# Patient Record
Sex: MALE | HISPANIC OR LATINO | Employment: FULL TIME | URBAN - METROPOLITAN AREA
[De-identification: names, ages, dates, MRNs, and addresses within clinical notes are randomized per-mention and may not be internally consistent; named-entity substitution may affect disease eponyms.]

---

## 2018-06-11 ENCOUNTER — HOSPITAL ENCOUNTER (EMERGENCY)
Facility: HOSPITAL | Age: 53
Discharge: HOME | End: 2018-06-11
Attending: EMERGENCY MEDICINE | Admitting: EMERGENCY MEDICINE
Payer: COMMERCIAL

## 2018-06-11 ENCOUNTER — APPOINTMENT (EMERGENCY)
Dept: RADIOLOGY | Facility: HOSPITAL | Age: 53
End: 2018-06-11
Payer: COMMERCIAL

## 2018-06-11 VITALS
SYSTOLIC BLOOD PRESSURE: 140 MMHG | OXYGEN SATURATION: 100 % | DIASTOLIC BLOOD PRESSURE: 81 MMHG | RESPIRATION RATE: 16 BRPM | TEMPERATURE: 98.1 F | HEART RATE: 69 BPM

## 2018-06-11 DIAGNOSIS — R07.89 CHEST WALL PAIN: Primary | ICD-10-CM
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
## 2018-06-11 PROCEDURE — 93010 ELECTROCARDIOGRAM REPORT: CPT | Performed by: INTERNAL MEDICINE

## 2018-06-11 PROCEDURE — 99284 EMERGENCY DEPT VISIT MOD MDM: CPT

## 2018-06-11 PROCEDURE — 93005 ELECTROCARDIOGRAM TRACING: CPT

## 2018-06-11 PROCEDURE — 71046 X-RAY EXAM CHEST 2 VIEWS: CPT

## 2018-06-11 PROCEDURE — 96372 THER/PROPH/DIAG INJ SC/IM: CPT

## 2018-06-11 RX ORDER — KETOROLAC TROMETHAMINE 30 MG/ML
60 INJECTION, SOLUTION INTRAMUSCULAR; INTRAVENOUS ONCE
Status: COMPLETED | OUTPATIENT
Start: 2018-06-11 | End: 2018-06-11

## 2018-06-11 RX ORDER — CYCLOBENZAPRINE HCL 10 MG
10 TABLET ORAL ONCE
Status: COMPLETED | OUTPATIENT
Start: 2018-06-11 | End: 2018-06-11

## 2018-06-11 RX ORDER — NAPROXEN 500 MG/1
500 TABLET ORAL 2 TIMES DAILY WITH MEALS
Qty: 30 TABLET | Refills: 0 | Status: SHIPPED | OUTPATIENT
Start: 2018-06-11 | End: 2018-07-09 | Stop reason: ALTCHOICE

## 2018-06-11 RX ORDER — CYCLOBENZAPRINE HCL 10 MG
10 TABLET ORAL 2 TIMES DAILY PRN
Qty: 20 TABLET | Refills: 0 | Status: SHIPPED | OUTPATIENT
Start: 2018-06-11 | End: 2018-07-09 | Stop reason: ALTCHOICE

## 2018-06-11 RX ADMIN — CYCLOBENZAPRINE HYDROCHLORIDE 10 MG: 10 TABLET, FILM COATED ORAL at 09:13

## 2018-06-11 RX ADMIN — KETOROLAC TROMETHAMINE 60 MG: 30 INJECTION, SOLUTION INTRAMUSCULAR at 09:10

## 2018-06-11 NOTE — DISCHARGE INSTRUCTIONS
Dolor de la pared torácica, cuidados ambulatorios   INFORMACIÓN GENERAL:   El dolor de la pared torácica  puede ser causado por problemas con los músculos, cartílago o huesos de la pared torácica  El dolor de la pared torácica también puede ser causado por dolor que se propaga a jarrett pecho y que proviene de otra parte de jarrett cuerpo  El dolor puede ser persistente, severo, leve o brett  Puede que venga y vaya o puede que sea [place]  El dolor también puede ser peor cuando usted se mueve de ciertas formas, respira profundo o tose  Busque cuidados inmediatos para los siguientes síntomas:   · Dolor severo    · Usted tiene cualquiera de los siguientes signos de un ataque cardíaco:      ¨ Estornudos, presión, o dolor en jarrett pecho que dura mas de 5 minutos o regresa  ¨ Incomodidad o dolor en jarrett espalda, beth, mandíbula, estómago, o brazos  ¨ Tiene dificultad para respirar  ¨ Náusea o vómitos  ¨ Se siente muy desvanecido o tiene sudores fríos especialmente en el pecho o dificultad para respirar  El tratamiento  depende de la causa de jarrett dolor de la pared torácica  Es probable que usted necesite alguno de los siguientes:  · Los antiiflamatorios no esteroideos (AINEs) ayudan a reducir inflamación y dolor o fiebre  Lin medicamento esta disponible con o sin saundra receta médica  Los AINEs podrían causar sangrado estomacal o problemas en los riñones en PFSweb  Si usted esta tomando un anticoágulante, siempre  pregunte a jarrett proveedor de nikole si los AINEs son seguros para usted  Antes de [place], jordy siempre la etiqueta de información y siga jayshree indicaciones  · El acetaminofén  disminuye el dolor y está disponible sin receta médica  Pregunte cuánto summer y con cuánta frecuencia  [facility]  el acetaminofén puede causar daño al hígado si no se rosario correctamente  · Aplique calor  a jarrett pecho por 20 a 30 minutos cada 2 horas por tantos días marky le indiquen   El calor ayuda a disminuir el dolor y los espasmos musculares  · Aplique hielo  a jarrett pecho por 15 a 20 minutos cada hora según indicaciones  Use saundra compresa de hielo o ponga hielo triturado en saundra bolsa de plástico  Liana Session con saundra toalla  El hielo ayuda a prevenir daños a los tejidos y disminuye la inflamación y el dolor  Programe saundra lizzy con jarrett proveedor de [employer] se le haya indicado: Anote jayshree preguntas para que se acuerde de hacerlas bárbara jayshree visitas  ACUERDOS SOBRE JARRETT CUIDADO:   Usted tiene el derecho de participar en la planificación de jarrett cuidado  Aprenda todo lo que pueda sobre jarrett condición y marky darle tratamiento  Discuta con jayshree médicos jayshree opciones de tratamiento para juntos decidir el cuidado que usted quiere recibir  Usted siempre tiene el derecho a rechazar jarrett tratamiento  Esta información es sólo para uso en educación  Jarrett intención no es darle un consejo médico sobre enfermedades o tratamientos  Colsulte con jarrett Dorna Riaz farmacéutico antes de seguir cualquier régimen médico para saber si es seguro y efectivo para usted  © 2014 [address] is for End User's use only and may not be sold, redistributed or otherwise used for commercial purposes  All illustrations and images included in CareNotes® are the copyrighted property of A D A M , Inc  or [facility]  Accidente automovilístico   LO QUE NECESITA SABER:   Los accidentes automovilísticos pueden causar lesiones ocasionadas por el impacto o por vitaliy sido movido de un lado al otro dentro del marleen  Podría tener un hematoma en el abdomen, pecho o beth debido al cinturón de seguridad  También puede que tenga dolor en jarrett zane, beth o espalda  Podría sentir dolor en las rodillas, caderas o muslos si jarrett cuerpo golpea el tablero o el volante  El dolor muscular tiende a empeorar de 1 a 2 días después del accidente    INSTRUCCIONES SOBRE EL [facility] HOSPITALARIA:   Kayleename al 911 si presenta:   · Usted tiene un nuevo dolor de pecho o éste [facility], o tiene falta de [place]  Regrese a la елена de emergencias si:   · Usted tiene un dolor nuevo o peor en el abdomen  · Usted tiene náuseas y vómitos que no mejoran  · Usted tiene un rahel dolor de marlene  · Usted tiene debilidad, hormigueo o adormecimiento en jayshree brazos o piernas  · Usted tiene un dolor nuevo o peor que le dificulta el movimiento  Pregúntele a jarrett [employer] vitaminas y minerales son adecuados para usted  · Usted tiene dolor que aparece de 2 a 3 días después del accidente  · Usted tiene preguntas o inquietudes acerca de jarrett condición o cuidado  Medicamentos:   · Analgésicos:  Usted podría recibir medicamento para quitarle o reducir el dolor  No espere a que el dolor sea muy intenso para summer el medicamento  · AINEs (Analgésicos antiinflamatorios no esteroides) marky el ibuprofeno, ayudan a disminuir la inflamación, el dolor y la [place]  Lin medicamento esta disponible con o sin saundra receta médica  Los AINEs pueden causar sangrado estomacal o problemas renales en ciertas personas  Si usted esta tomando un anticoágulante,  siempre  pregunte si los AINEs son seguros para usted  Siempre jordy la etiqueta de lin medicamento y [place] instrucciones  No administre lin medicamento a niños menores de 6 meses de everette sin antes obtener la autorización de jarrett médico      · [place] jayshree medicamentos marky se le haya indicado  Consulte con jarrett médico si usted genie que jarrett medicamento no le está ayudando o si presenta efectos secundarios  Infórmele si es alérgico a algún medicamento  Mantenga saundra lista actualizada de los [place], las vitaminas y los productos herbales que rosario  Incluya los siguientes datos de los medicamentos: cantidad, frecuencia y motivo de administración  Traiga con usted la lista o los envases de la píldoras a jayshree citas de seguimiento  Lleve la lista de los medicamentos con usted en roberto de saundra emergencia    Acuda a jayshree consultas de control con jarrett médico según le indicaron  Anote jayshree preguntas para que se acuerde de hacerlas bárbara jayshree visitas  Consejos de seguridad:   · Use siempre jarrett cinturón de seguridad  El [place] de jarrett cinturón de seguridad ayudará a reducir las lesiones sufridas por accidentes automovilísticos  · Use asientos de seguridad para niños  Es necesario que jarrett regino se siente en un asiento de seguridad para niños hecho para jarrett edad, altura, y [place]  Pregúntele a jarrett médico sobre [address] acerca de los asientos de seguridad para niños  · Brianafurt velocidad  Maneje jarrett marleen al límite de velocidad para reducir jarrett riesgo de accidentes automovilísticos  · No maneje si se siente cansado  Usted reacciona más lentamente cuando está cansado  El tiempo de reacción lento aumentará el riesgo de un accidente automovilístico      · No hable por teléfono ni envíe mensajes de texto [employer]  Usted no reaccionará lo suficientemente rápido en saundra emergencia si se distrae con mensajes de texto o conversaciones  · No kartik y [place]  Use un chofer designado  Llame un taxi o pídale a alguien que lo lleve a casa si ha estado bebiendo alcohol  No permita que jayshree amigos manejen si smith estado bebiendo alcohol  · No consuma drogas ilegales y [place]  Es probable que se sienta más cansado o tome riesgos que usualmente no tomaría  No maneje después de summer medicamentos prescritos que le dan sueño  Cuidados personales:   · Use hielo y calor  El hielo ayuda a disminuir la inflamación y el dolor  El hielo también puede contribuir a evitar el daño de los tejidos  Use un paquete de hielo o ponga hielo molido dentro de [employer]  [place] con saundra toalla y aplíquela al área adolorida por 15 a 20 minutos cada hora o marky se le indique  Después de 2 días, use saundra compresa caliente [employer]  Aplique calor marky se lo recomiende el médico      · Estire jayshree músculos cuidadosamente    Uzma Chris para estirar jayshree músculos después de vitaliy sufrido un accidente automovilístico  Consulte con moreno médico sobre cuáles ejercicios hacer  © 2017 [address] Information is for End User's use only and may not be sold, redistributed or otherwise used for commercial purposes  All illustrations and images included in CareNotes® are the copyrighted property of A D A M , Inc  or Baudilio Torres  Esta información es sólo para uso en educación  Moreno intención no es darle un consejo médico sobre enfermedades o tratamientos  Colsulte con moreno Sue Teddy farmacéutico antes de seguir cualquier régimen médico para saber si es seguro y efectivo para usted

## 2018-06-13 LAB
ATRIAL RATE: 66 BPM
P AXIS: 16 DEGREES
PR INTERVAL: 148 MS
QRS AXIS: 74 DEGREES
QRSD INTERVAL: 94 MS
QT INTERVAL: 398 MS
QTC INTERVAL: 417 MS
T WAVE AXIS: 13 DEGREES
VENTRICULAR RATE: 66 BPM
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
## 2018-07-09 ENCOUNTER — APPOINTMENT (EMERGENCY)
Dept: RADIOLOGY | Facility: HOSPITAL | Age: 53
End: 2018-07-09
Payer: COMMERCIAL

## 2018-07-09 ENCOUNTER — HOSPITAL ENCOUNTER (EMERGENCY)
Facility: HOSPITAL | Age: 53
Discharge: HOME | End: 2018-07-09
Attending: EMERGENCY MEDICINE
Payer: COMMERCIAL

## 2018-07-09 VITALS
TEMPERATURE: 97.8 F | WEIGHT: 160 LBS | DIASTOLIC BLOOD PRESSURE: 79 MMHG | RESPIRATION RATE: 16 BRPM | HEART RATE: 64 BPM | OXYGEN SATURATION: 99 % | SYSTOLIC BLOOD PRESSURE: 139 MMHG

## 2018-07-09 DIAGNOSIS — S20.219A CONTUSION OF CHEST WALL, UNSPECIFIED LATERALITY, INITIAL ENCOUNTER: Primary | ICD-10-CM

## 2018-07-09 LAB
ATRIAL RATE: 63 BPM
P AXIS: 10 DEGREES
PR INTERVAL: 144 MS
QRS AXIS: 69 DEGREES
QRSD INTERVAL: 80 MS
QT INTERVAL: 398 MS
QTC INTERVAL: 407 MS
T WAVE AXIS: 31 DEGREES
VENTRICULAR RATE: 63 BPM

## 2018-07-09 PROCEDURE — 93005 ELECTROCARDIOGRAM TRACING: CPT

## 2018-07-09 PROCEDURE — 99285 EMERGENCY DEPT VISIT HI MDM: CPT

## 2018-07-09 PROCEDURE — 72125 CT NECK SPINE W/O DYE: CPT

## 2018-07-09 PROCEDURE — 71250 CT THORAX DX C-: CPT

## 2018-07-09 PROCEDURE — 93010 ELECTROCARDIOGRAM REPORT: CPT | Performed by: INTERNAL MEDICINE

## 2018-07-09 RX ORDER — CYCLOBENZAPRINE HCL 10 MG
10 TABLET ORAL 2 TIMES DAILY PRN
Qty: 20 TABLET | Refills: 0 | Status: SHIPPED | OUTPATIENT
Start: 2018-07-09
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
## 2018-07-09 NOTE — DISCHARGE INSTRUCTIONS
Contusión en adultos   LO QUE NECESITA SABER:   [place] contusión es un moretón que aparece en la piel después de saundra Luz Budge  Un moretón se forma cuando se rompen los vasos sanguíneos pequeños, heather no se rompe la piel  Cuando los vasos sanguíneos se desgarran, la naresh se filtra a los tejidos cercanos, marky los tejidos blandos o los músculos  INSTRUCCIONES SOBRE EL [facility]ITAL[facility]:   Regrese a la елена de emergencias si:   · Le surge dificultad para  el área lesionada  · Usted tiene hormigueo o entumecimiento en el área lesionada o cerca de [place]  · El área de jarrett mano o pie que se encuentra debajo del moretón se pone fría o pálida  Pregúntele a jarrett [place] vitaminas y minerales son adecuados para usted  · Usted encuentra un bulto nuevo en el área lesionada  · Jayshree síntomas no mejoran después de 4 ó 5 días de [place]  · Usted tiene preguntas o inquietudes acerca de jarrett condición o cuidado  Medicamentos:  Es posible que usted necesite alguno de los siguientes:  · AINEs (Analgésicos antiinflamatorios no esteroides)  pueden disminuir la inflamación y el dolor o la fiebre  Kenna medicamento esta disponible con o sin saundra receta médica  Los AINEs pueden causar sangrado estomacal o problemas renales en ciertas personas  Si usted rosario un medicamento anticoagulante, siempre pregúntele a jarrett médico si los RADHA son seguros para usted  Siempre jordy la etiqueta de kenna medicamento y [place] instrucciones  · Un medicamento con receta para el dolor  podrían ser [place]  No espere a que el dolor sea muy intenso para summer el medicamento  · [place] jayshree medicamentos marky se le haya indicado  Consulte con jarrett médico si usted genie que jarrett medicamento no le está ayudando o si presenta efectos secundarios  Infórmele si es alérgico a algún medicamento  Mantenga saundra lista actualizada de los [place], las vitaminas y los productos herbales que rosario   Incluya los siguientes datos de los medicamentos: cantidad, frecuencia y motivo de administración  Traiga con usted la lista o los envases de la píldoras a jayshree citas de seguimiento  Lleve la lista de los medicamentos con usted en roberto de saundra emergencia  Acuda a jayshree consultas de control con jarrett médico según le indicaron  Es posible que deba regresar dentro de saundra semana para que le vuelvan a revisar la lesión  Anote jayshree preguntas para que se acuerde de hacerlas bárbara jayshree visitas  Ayude a que jarrett contusión sane:   · [address] [place] [address] menos que de [facility]  Si a usted le salieron moretones en jarrett pierna o pie, es posible que deba usar muletas o un bastón para caminar  [place] le ayudará a no apoyarse en la parte lesionada del cuerpo  · Aplique hielo  para bajar la inflamación y calmar el dolor  El hielo también puede contribuir a evitar el daño de los tejidos  Use un paquete de hielo o ponga hielo molido dentro de [employer]  Cubra el hielo con saundra toalla y colóquelo sobre el moretón bárbara 15 a 20 minutos cada hora o según le indiquen  · Use compresión  para apoyar Gilbert Faes y disminuir la hinchazón  Coloque un vendaje elástico alrededor de la ludin sobre el músculo lesionado  Asegúrese de que el vendaje no quede demasiado apretado  Se debería poder meter 1 dedo entre el vendaje y la piel  · Eleve la parte lesionada de jarrett cuerpo  por encima del nivel de jarrett corazón para ayudar a aliviar el dolor y la inflamación  Use almohadas, cobijas o toallas enrolladas para elevar el área tan a menudo marky sea posible  · No consuma alcohol  según las indicaciones  El alcohol puede retardar la curación  · No estire los músculos lesionados  inmediatamente después de la lesión  Pregunte a jarrett médico cuándo y cómo se puede estirar con precaución después de jarrett lesión  Los estiramientos suaves pueden ayudar a aumentar la flexibilidad  · No masajee el área ni utilice almohadillas térmicas  en la contusión inmediatamente después de la lesión   El calor y los masajes podrían retrasar la sanación  Jarrett médico podría indicarle que aplique calor después de varios días  En odin momento, el calor comenzará a servir para sanar la lesión  Evite otra contusión:   · Estírese y [place] en calor antes de practicar deportes o hacer ejercicio  · Use equipo de protección cuando practique deportes  Keeley Polka son Kike Kofi y las prendas [address] [place]  · Si comienza a hacer saundra nueva actividad física, empiece poco a poco para darle tiempo al cuerpo de acostumbrarse  © 2017 [facility] Information is for End User's use only and may not be sold, redistributed or otherwise used for commercial purposes  All illustrations and images included in CareNotes® are the copyrighted property of A D A M , Inc  or Baudilio Torres  Esta información es sólo para uso en educación  Jarrett intención no es darle un consejo médico sobre enfermedades o tratamientos  Colsulte con jarrett Dorna Riaz farmacéutico antes de seguir cualquier régimen médico para saber si es seguro y efectivo para usted

## 2021-07-26 ENCOUNTER — OFFICE VISIT (OUTPATIENT)
Dept: DENTISTRY | Facility: CLINIC | Age: 56
End: 2021-07-26

## 2021-07-26 VITALS — HEART RATE: 66 BPM | SYSTOLIC BLOOD PRESSURE: 149 MMHG | DIASTOLIC BLOOD PRESSURE: 84 MMHG

## 2021-07-26 DIAGNOSIS — K08.50 DEFECTIVE DENTAL RESTORATION: Primary | ICD-10-CM

## 2021-07-26 NOTE — PROGRESS NOTES
[facility] presents for crown prep #18  PMH reviewed, no changes  Fabricated bite matrix with bluprint and triple tray  Applied topical benzocaine, administered 2 carpules 2% lido 1:100k epi via MEME block and 1 carpule 4% articaine 1:100k epi via local infiltration  Verified anesthesia  Tooth prepped with reductions for PFZ  Patient had a strong tongue that was difficult to retract  He also could not open very wide for adequate access  Lastly, he tended to breathe with his mouth instead of his nose making him ask for breaks/suction with small amounts of water  Because of this, the preparation was not able to be refined today  At the next appointment, please drop the margins of the preparation subgingivally to aid in better retention of the future crown  Made provisional crown with provisa and matrix, refined with ayana on high speed  Confirmed provisional covers margins with no overhangs  Cemented provisional crown using Provicell  Removed excess cement, occlusion and contacts verified  Pt left satisfied and ambulatory      NV: Finalize crown prep and take impressions for #18

## 2021-08-31 ENCOUNTER — OFFICE VISIT (OUTPATIENT)
Dept: DENTISTRY | Facility: CLINIC | Age: 56
End: 2021-08-31

## 2021-08-31 VITALS — SYSTOLIC BLOOD PRESSURE: 129 MMHG | DIASTOLIC BLOOD PRESSURE: 76 MMHG | HEART RATE: 64 BPM

## 2021-08-31 DIAGNOSIS — Z01.21 ENCOUNTER FOR DENTAL EXAMINATION AND CLEANING WITH ABNORMAL FINDINGS: Primary | ICD-10-CM

## 2021-08-31 PROCEDURE — D0140 LIMITED ORAL EVALUATION - PROBLEM FOCUSED: HCPCS | Performed by: DENTIST

## 2021-08-31 NOTE — PROGRESS NOTES
Alexis Luo, 47 yo male came fr an emergency visit  Tooth #18 was prepped last time and temporary crown was placed  Patient lost the temporary crown and was sensitive to touch  PA was taken on tooth 18  There was no pre-apical pathology or radiolucency  Tooth #17 was erupting mesio-angularly which could be the reason for the pain  The clinical crown #18 was short especially on the distal, so the temporary crown kept on dislodging from the tooth  1 carpule of lidocaine 1:100,000 epi was used to block MEME and long buccal  Tooth 18 was prepped with short bur because patient can't open really big and clinical crown is short  The distal axial wall was increased and the mesial of the tooth was prepped as well  New temporary was made and cemented  Excess cement was removed from around the tooth using explorer and floss  Occlusion was check and patient left in stable condition  REFERRAL: WAS GIVEN TO [facility] FOR EXTRACTION OF 17  NV: after tooth 17 is extracted, refine margins on tooth 18 and take a final impression to send to lab for crown fabrication

## 2021-11-29 ENCOUNTER — HOSPITAL ENCOUNTER (EMERGENCY)
Facility: HOSPITAL | Age: 56
Discharge: HOME | End: 2021-11-29
Attending: EMERGENCY MEDICINE

## 2021-11-29 VITALS
RESPIRATION RATE: 18 BRPM | HEART RATE: 74 BPM | TEMPERATURE: 98.3 F | SYSTOLIC BLOOD PRESSURE: 144 MMHG | DIASTOLIC BLOOD PRESSURE: 97 MMHG | OXYGEN SATURATION: 98 %

## 2021-11-29 DIAGNOSIS — M79.10 MYALGIA: ICD-10-CM

## 2021-11-29 DIAGNOSIS — Z20.822 CLOSE EXPOSURE TO COVID-19 VIRUS: Primary | ICD-10-CM

## 2021-11-29 PROCEDURE — 99283 EMERGENCY DEPT VISIT LOW MDM: CPT

## 2021-11-29 PROCEDURE — U0005 INFEC AGEN DETEC AMPLI PROBE: HCPCS | Performed by: EMERGENCY MEDICINE

## 2021-11-29 PROCEDURE — U0003 INFECTIOUS AGENT DETECTION BY NUCLEIC ACID (DNA OR RNA); SEVERE ACUTE RESPIRATORY SYNDROME CORONAVIRUS 2 (SARS-COV-2) (CORONAVIRUS DISEASE [COVID-19]), AMPLIFIED PROBE TECHNIQUE, MAKING USE OF HIGH THROUGHPUT TECHNOLOGIES AS DESCRIBED BY CMS-2020-01-R: HCPCS | Performed by: EMERGENCY MEDICINE

## 2021-11-29 PROCEDURE — 99284 EMERGENCY DEPT VISIT MOD MDM: CPT | Performed by: EMERGENCY MEDICINE

## 2021-11-30 LAB — SARS-COV-2 RNA RESP QL NAA+PROBE: POSITIVE

## 2022-02-11 ENCOUNTER — IMMUNIZATIONS (OUTPATIENT)
Dept: FAMILY MEDICINE CLINIC | Facility: HOSPITAL | Age: 57
End: 2022-02-11

## 2022-02-11 DIAGNOSIS — Z23 ENCOUNTER FOR IMMUNIZATION: Primary | ICD-10-CM

## 2022-02-11 PROCEDURE — 0011A COVID-19 MODERNA VACC 0.5 ML: CPT

## 2022-02-11 PROCEDURE — 91301 COVID-19 MODERNA VACC 0.5 ML: CPT

## 2022-04-11 ENCOUNTER — OFFICE VISIT (OUTPATIENT)
Dept: DENTISTRY | Facility: CLINIC | Age: 57
End: 2022-04-11

## 2022-04-11 VITALS — HEART RATE: 73 BPM | SYSTOLIC BLOOD PRESSURE: 150 MMHG | DIASTOLIC BLOOD PRESSURE: 80 MMHG

## 2022-04-11 DIAGNOSIS — K02.9 CARIES: Primary | ICD-10-CM

## 2022-04-11 PROCEDURE — WIS2001 FINAL IMPRESSION - CROWN OR IMPLANT: Performed by: DENTIST

## 2022-04-11 NOTE — PROGRESS NOTES
Final Impression    Ashanti Ramos presents for crown prep refinement and final impression #18  PMH reviewed, no changes  Pt presents with #18 previously prepped and missing temporary crown  Pt does not have his temporary crown with him today  Applied topical benzocaine, administered 1 carps 2% lido 1:100k epi via MEME block and  1 carps 4% articaine 1:100k epi via local infiltration  Tooth prep margins modified to gain more crown height and more pronounced margins  Made provisional crown with provisa and matrix, refined with ayana on high speed  Confirmed provisional covers margins with no overhangs  Packed size 0 cord soaked in hemodent  Packed size 1 cord soaked in hemodent  Removed cord, air dry  Impression made with Delkit light and heavy body using triple tray  Impression removed after 5 min, confirmed preparation captured with no voids or distortions  Removed size 0 cord  Cemented provisional crown using Provisa temporary cement  Removed excess cement, occlusion and contacts verified  Selected porcelain shade A2, pt confirmed with mirror  Pt left satisfied and ambulatory    ?    NV: delivery of zirconia crown #18  Ww: Dr Jose Chambers

## 2022-05-02 ENCOUNTER — OFFICE VISIT (OUTPATIENT)
Dept: DENTISTRY | Facility: CLINIC | Age: 57
End: 2022-05-02

## 2022-05-02 DIAGNOSIS — Z01.21 ENCOUNTER FOR DENTAL EXAMINATION AND CLEANING WITH ABNORMAL FINDINGS: Primary | ICD-10-CM

## 2022-05-02 PROCEDURE — D2740 CROWN - PORCELAIN/CERAMIC: HCPCS | Performed by: DENTIST

## 2022-05-02 PROCEDURE — D0220 INTRAORAL - PERIAPICAL FIRST RADIOGRAPHIC IMAGE: HCPCS | Performed by: DENTIST

## 2022-05-02 NOTE — PROGRESS NOTES
Pt  Presented for having his final all ceramic/zirconia crown cemented permanently on tooth # 18  Reviewing [address] : No change  Temporary crown setting loosely on # `18 very easily removed with the finger of the provider  ,Tooth # 18 is vital with some hypersensitivity with cold air  Cleaning the prep area with water,sitting the permanent crown then verifying the occlusion  Occlusion needed slight adjustment at the disto-occlusal area of the crown  Occlusion rechecked,Pt  Was satisfied  Permanent crown cemented with dual cure permanent cement  excess cement removed  And post op x-ray was taken  Final occlusion checked  post op instructions given  Pt  Left satisfied  NV : reevaluation # 18 if needed

## 2023-01-09 ENCOUNTER — OFFICE VISIT (OUTPATIENT)
Dept: DENTISTRY | Facility: CLINIC | Age: 58
End: 2023-01-09

## 2023-01-09 DIAGNOSIS — S02.5XXA: Primary | ICD-10-CM

## 2023-01-09 NOTE — PROGRESS NOTES
Limited Exam by Dr Christopher Sutherland  Pt presents for Emergency visit  Pt reports in Romansh to  that he is here to get UR fixed  #4 , he is not in pain  Only some discomfort when drinking smth cold  Tooth broken down even more and now requires RCT, post and crown  No EO,IO swelling, no bleeding or purulence  Dr Discussed tx plan options with pt 1)-save tooth with rct, post ands crown 2)-ext  Pt wants to save tooth and wants to continue with rct , post, crown       NV: start RCT #4

## 2023-02-22 ENCOUNTER — OFFICE VISIT (OUTPATIENT)
Dept: DENTISTRY | Facility: CLINIC | Age: 58
End: 2023-02-22

## 2023-02-22 VITALS — SYSTOLIC BLOOD PRESSURE: 136 MMHG | DIASTOLIC BLOOD PRESSURE: 87 MMHG | HEART RATE: 76 BPM

## 2023-02-22 DIAGNOSIS — K02.9 DENTAL CARIES INTO PULP: Primary | ICD-10-CM

## 2023-02-22 NOTE — PROGRESS NOTES
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  Applied topical benzocaine, administered 1 carps 2% lidocaine 1:100k epi via Infiltrations  Clamp and rubber dam placed  Caries removed and pulp chamber accessed with round carbide  Early coronal flare with GGB #1-4  Working length determined with apex locater to be 21 mm from incisal edge  Hand file then primary rotary filed with NaOCL irrigation and RC prep to measurements specified below  Dried canal with paper points, placed BC sealer with / paper point  Obturated canal with single primary cone and removed excess carrier with preppi bur  Restored with Cavit and occlusion verified  Obtained PA radiograph  Obturation is dense with no porosities and filled to apex, minor sealer extrusion present  Pt left ambulatory and satisfied  Gave post op instructions to avoid chewing till numbness goes away  All questions answered  Pt left satisfied and in good health  Prognosis is Good  Referrals Needed: No      Next visit: build up and crown prep #4    GUDELIA Palmer

## 2023-07-05 ENCOUNTER — OFFICE VISIT (OUTPATIENT)
Dept: DENTISTRY | Facility: CLINIC | Age: 58
End: 2023-07-05

## 2023-07-05 VITALS — SYSTOLIC BLOOD PRESSURE: 126 MMHG | HEART RATE: 64 BPM | DIASTOLIC BLOOD PRESSURE: 83 MMHG

## 2023-07-05 DIAGNOSIS — Z98.811 DENTAL CROWNS STATUS: Primary | ICD-10-CM

## 2023-07-05 PROCEDURE — WIS2000 CROWN PREP: Performed by: DENTIST

## 2023-07-05 PROCEDURE — D2950 CORE BUILDUP, INCLUDING ANY PINS WHEN REQUIRED: HCPCS | Performed by: DENTIST

## 2023-07-05 NOTE — PROGRESS NOTES
Patient presents for crown build up and crown prep #4 and verbally consents for treatment:  Reviewed health history-  Pt is ASA type I  Treatment consents signed: Yes  Perio: plaque  Pain Scale:0  Caries Assessment: medium  Radiographs: Films are current  Oral Hygiene instruction reviewed and given  Hygiene recall visits recommended to the patient      Tooth #4 no pathology noted. Pt reports no pain but disclosed part of the tooth fractured. Some of the lingual wall had broken. Tooth still restorable and no post indicated at the moment. Dental Anesthesia: 1 Carpule 2% Lidocaine 1:100K epi infiltrations. Build up done with EvoCeram A2 composite. [place] prep done and final impressions taken. A2 shade. Provisonal done with Provisca and cemented with temporary cement. Polished with finishing burs and Enhance. Occlusion adjusted and contact good and adequate. Gave post op instructions to avoid chewing till numbness goes away. All questions answered. Pt left satisfied and in good health. Prognosis is Good. Referrals Needed: No      Next visit: crown #4 delivery     LUDA Laboy

## 2023-10-18 ENCOUNTER — OFFICE VISIT (OUTPATIENT)
Dept: DENTISTRY | Facility: CLINIC | Age: 58
End: 2023-10-18

## 2023-10-18 VITALS — HEART RATE: 73 BPM | SYSTOLIC BLOOD PRESSURE: 138 MMHG | DIASTOLIC BLOOD PRESSURE: 83 MMHG

## 2023-10-18 DIAGNOSIS — Z98.811 DENTAL CROWNS STATUS: Primary | ICD-10-CM

## 2023-10-18 PROCEDURE — LAB02 LAB FEE CROWN: Performed by: DENTIST

## 2023-10-18 PROCEDURE — D2740 CROWN - PORCELAIN/CERAMIC: HCPCS | Performed by: DENTIST

## 2023-10-18 NOTE — DENTAL PROCEDURE DETAILS
Patient presents for crown delivery #4 and verbally consents for treatment: Tooth has been asymptomatic and temporary crown in placed. No pathology noted with the tooth. Reviewed health history-  Pt is ASA type I  Treatment consents signed: Yes  Perio: plaque  Pain Scale:0  Caries Assessment: moderate  Radiographs: Films are current  Oral Hygiene instruction reviewed and given  Hygiene recall visits recommended to the patient    Tried in Zirconia crown #4. Fit was good. Took bitewing to confirm seating and seating was good with closed margins all around. Showed to pt with pt's mirror and pt was satisfied with fit and esthetic and gave final OK to cement. Cemented with Calibra Permanent cement. Excess cement cleaned out. Gave post op instructions to avoid chewing tfor 2-3 hours. .    All questions answered. Pt left satisfied and in good health. Prognosis is Good. Referrals Needed: No      Next visit: comp exam     LUDA Appiah

## 2024-02-05 ENCOUNTER — OFFICE VISIT (OUTPATIENT)
Dept: DENTISTRY | Facility: CLINIC | Age: 59
End: 2024-02-05

## 2024-02-05 VITALS — SYSTOLIC BLOOD PRESSURE: 151 MMHG | HEART RATE: 64 BPM | DIASTOLIC BLOOD PRESSURE: 86 MMHG

## 2024-02-05 DIAGNOSIS — Z01.20 ENCOUNTER FOR DENTAL EXAM AND CLEANING W/O ABNORMAL FINDINGS: Primary | ICD-10-CM

## 2024-02-05 PROCEDURE — D0150 COMPREHENSIVE ORAL EVALUATION - NEW OR ESTABLISHED PATIENT: HCPCS

## 2024-02-05 PROCEDURE — D0210 INTRAORAL - COMPLETE SERIES OF RADIOGRAPHIC IMAGES: HCPCS

## 2024-02-05 NOTE — DENTAL PROCEDURE DETAILS
Hayder Ku presents for a Comprehensive exam. Verbal consent for treatment given in addition to the forms.     Reviewed health history - Patient is ASA I  Consents signed: Yes     Perio: Normal  Pain Scale: 0  Caries Assessment: Low  Radiographs: Complete mouth series     COMP EXAM, FMX, PROBE EXAM   Pt's preferred language is British. Used  #  [id number] at beginning of appointment.   REVIEWED MED HX: meds, allergies, health changes reviewed in EPIC   -no health concerns per patient, no meds, no allergies  CHIEF CONCERN:     -Last dental visit was 3 months.    - pt is here for generalized checkup, pt reports 1 tooth causing discomfort with cold on LL. Explained with chart teeth, do perio probing/ radiographs. Pt thinks he needs just regular cleaning because nothing hurts.    PAIN SCALE:  0  ASA CLASS:  I  PLAQUE:  mild  CALCULUS:  moderate  BLEEDING:   some bleeding upon probing  STAIN :   none   ORAL HYGIENE: fair  PERIO: full probe exam- 2-3mm. Isolated 4 mm present.     Visual and Tactile Intraoral/ Extraoral evaluation: Oral and Oropharyngeal cancer evaluation. No findings     Dr. Garcia exam=   Reviewed with patient clinical and radiographic findings and patient verbalized understanding. All questions and concerns addressed.   Used  # [id number]    to go over treatment plan  - recommend regular prophy  - recommend crown for #19 for large restoration  - recommend try sensitive toothpaste for #20. Only sens to cold for few seconds  - - discussed bridge/ implant for #21 area.  REFERRALS: no referrals needed    CARIES FINDINGS: no decay noted       NEXT VISIT:   1) Adult prophy  2) #19 crown- discuss implant for #21     Last FMX : 2/5/24

## 2024-02-05 NOTE — PROGRESS NOTES
ADULT PROPHY   REVIEWED MED HX: meds, allergies, health changes reviewed in [facility]. All consents signed.  CHIEF CONCERN:  none   PAIN SCALE:  0  ASA CLASS:  I  PLAQUE:  mild    moderate    heavy  *  CALCULUS:  no calculus noted     light   moderate    heavy *  BLEEDING:   none    light   moderate   heavy  STAIN :   none    light    moderate   heavy*  ORAL HYGIENE:  good    fair   poor/needs improvement  PERIO:     Hand scaled, polished and flossed. Used Cavitron.     Oral Hygiene Instruction:  recommended brushing 2 x daily for 2 minutes MIN, recommended flossing daily, reviewed dietary precautions.  Dispensed: toothbrush, toothpaste and floss    Visual and Tactile Intraoral/ Extraoral evaluation: Oral and Oropharyngeal cancer evaluation. No findings     REFERRALS: no referrals provided    CARIES FINDINGS:        TREATMENT  PLAN :   1)     Next Recall: 6 month recall     Last Panorex/ FMX : 2/5/24

## 2024-02-05 NOTE — PROGRESS NOTES
COMP EXAM, FMX, PROBE EXAM   Pt's preferred language is Jordanian. Used  #  [id number] at beginning of appointment.   REVIEWED MED HX: meds, allergies, health changes reviewed in EPIC   -no health concerns per patient, no meds, no allergies  CHIEF CONCERN:     -Last dental visit was 3 months.    - pt is here for generalized checkup, pt reports 1 tooth causing discomfort with cold on LL. Explained with chart teeth, do perio probing/ radiographs. Pt thinks he needs just regular cleaning because nothing hurts.    PAIN SCALE:  0  ASA CLASS:  I  PLAQUE:  mild  CALCULUS:  moderate  BLEEDING:   some bleeding upon probing  STAIN :   none   ORAL HYGIENE: fair  PERIO: full probe exam- 2-3mm. Isolated 4 mm present.     Visual and Tactile Intraoral/ Extraoral evaluation: Oral and Oropharyngeal cancer evaluation. No findings     Dr. Gacria exam=   Reviewed with patient clinical and radiographic findings and patient verbalized understanding. All questions and concerns addressed.   Used  # [id number]    to go over treatment plan  - recommend regular prophy  - recommend crown for #19 for large restoration  - recommend try sensitive toothpaste for #20. Only sens to cold for few seconds  - - discussed bridge/ implant for #21 area.  REFERRALS: no referrals needed    CARIES FINDINGS: no decay noted       NEXT VISIT:   1) Adult prophy  2) #19 crown- discuss implant for #21     Last FMX : 2/5/24

## 2024-02-06 ENCOUNTER — OFFICE VISIT (OUTPATIENT)
Dept: DENTISTRY | Facility: CLINIC | Age: 59
End: 2024-02-06

## 2024-02-06 DIAGNOSIS — Z01.20 ENCOUNTER FOR DENTAL EXAM AND CLEANING W/O ABNORMAL FINDINGS: Primary | ICD-10-CM

## 2024-02-06 PROCEDURE — D1330 ORAL HYGIENE INSTRUCTIONS: HCPCS

## 2024-02-06 PROCEDURE — D1110 PROPHYLAXIS - ADULT: HCPCS

## 2024-02-06 NOTE — DENTAL PROCEDURE DETAILS
Pt arrived at [facility] for prophy apt.   Reviewed Medical History  ASA:I  Chief Complaint:none    Adult Prophy, [facility]  Intraoral exam:no findings  Oral Hygiene: lt to moderate general. plaque, moderate local. calculus, moder gen. bleeding  Hand scaled, Flossed, polished  Patient tolerated well      NV:[place]  Needs:6 mos per ex proo 7/2024      Mary Kay Deleon RDH., PHDHP.

## 2025-02-13 ENCOUNTER — OFFICE VISIT (OUTPATIENT)
Dept: DENTISTRY | Facility: CLINIC | Age: 60
End: 2025-02-13

## 2025-02-13 VITALS — SYSTOLIC BLOOD PRESSURE: 142 MMHG | HEART RATE: 71 BPM | DIASTOLIC BLOOD PRESSURE: 91 MMHG

## 2025-02-13 DIAGNOSIS — Z01.20 ENCOUNTER FOR DENTAL EXAM AND CLEANING W/O ABNORMAL FINDINGS: Primary | ICD-10-CM

## 2025-02-13 PROCEDURE — D0120 PERIODIC ORAL EVALUATION - ESTABLISHED PATIENT: HCPCS

## 2025-02-13 PROCEDURE — D0220 INTRAORAL - PERIAPICAL FIRST RADIOGRAPHIC IMAGE: HCPCS

## 2025-02-13 PROCEDURE — D0274 BITEWINGS - 4 RADIOGRAPHIC IMAGES: HCPCS

## 2025-02-13 PROCEDURE — D1110 PROPHYLAXIS - ADULT: HCPCS

## 2025-02-13 NOTE — PROGRESS NOTES
PERIODIC EXAM, ADULT PROPHY , 4 BWX, 1 PA UR   REVIEWED MED HX: meds, allergies, health changes reviewed in EPIC. All consents signed.  -informed pt bp slightly elevated and to watch.  CHIEF CONCERN:no dental pain but pt would like us to check the implant on UR. Pt states it was placed 5 years ago and is concerned because it has been loose for 1 month.   PAIN SCALE:  0  ASA CLASS:  I  PLAQUE:  moderate  CALCULUS:  moderate  BLEEDING:   light  STAIN :   none      PERIO:     Hygiene Procedures:  Scaled, Polished, Flossed and Used Cavitron    Oral Hygiene Instruction: Brushing Minimum 2x daily for 2 minutes, daily flossing    Dispensed: Toothbrush, Toothpaste, Floss    Visual and Tactile Intraoral/ Extraoral evaluation: Oral and Oropharyngeal cancer evaluation. No findings     Dr. Campbell   Reviewed with patient clinical and radiographic findings and patient verbalized understanding. All questions and concerns addressed.     REFERRALS: none    CARIES FINDINGS: no decay noted    Next Recall: 6 month recall     Last BWX: 2/13/25  Last Panorex/ FMX : 2/5/24